# Patient Record
Sex: MALE | Race: WHITE
[De-identification: names, ages, dates, MRNs, and addresses within clinical notes are randomized per-mention and may not be internally consistent; named-entity substitution may affect disease eponyms.]

---

## 2018-08-24 ENCOUNTER — HOSPITAL ENCOUNTER (EMERGENCY)
Dept: HOSPITAL 92 - ERS | Age: 33
LOS: 1 days | Discharge: HOME | End: 2018-08-25
Payer: COMMERCIAL

## 2018-08-24 DIAGNOSIS — T71.9XXA: Primary | ICD-10-CM

## 2018-08-24 DIAGNOSIS — I86.1: ICD-10-CM

## 2018-08-24 DIAGNOSIS — N50.3: ICD-10-CM

## 2018-08-24 PROCEDURE — 93976 VASCULAR STUDY: CPT

## 2018-08-24 PROCEDURE — 70498 CT ANGIOGRAPHY NECK: CPT

## 2018-08-24 PROCEDURE — 76870 US EXAM SCROTUM: CPT
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
## 2018-08-25 NOTE — ULT
TESTICULAR ULTRASOUND:

8/24/18

 

HISTORY: 

Left epididymal cyst. 

 

COMPARISON:  

None available.

 

FINDINGS:  

The testicles demonstrate a normal sonographic appearance bilaterally without evidence of a testicula
r mass. The right testicle measures 4.8 cm x 2.8 cm x 3 cm with the left testicle measuring 4.8 cm x 
2.3 cm x 2.6 cm. 

 

Doppler evaluation of each testicle with spectral analysis and color flow evaluation demonstrates art
erial and venous flow in each testicle. 

 

The right epididymis has a normal sonographic appearance. There is an approximately 2 cm anechoic str
ucture seen in the left epididymis likely related to either small spermatocele or epididymal cyst. 

 

There are small fluid adjacent to each testicle which may be physiologic in origin. 

 

IMPRESSION:  

1.      Normal appearing bilateral testicles with arterial flow documented in each testicle. 

2.      Left epididymal cyst versus spermatocele.

 

 

POS: [facility]